# Patient Record
Sex: FEMALE | Race: WHITE | NOT HISPANIC OR LATINO | URBAN - METROPOLITAN AREA
[De-identification: names, ages, dates, MRNs, and addresses within clinical notes are randomized per-mention and may not be internally consistent; named-entity substitution may affect disease eponyms.]

---

## 2021-09-23 ENCOUNTER — NURSING HOME VISIT (OUTPATIENT)
Dept: GERIATRICS | Facility: OTHER | Age: 86
End: 2021-09-23
Payer: COMMERCIAL

## 2021-09-23 VITALS
SYSTOLIC BLOOD PRESSURE: 111 MMHG | RESPIRATION RATE: 18 BRPM | DIASTOLIC BLOOD PRESSURE: 66 MMHG | TEMPERATURE: 97.3 F | HEART RATE: 72 BPM | WEIGHT: 169.4 LBS | OXYGEN SATURATION: 93 %

## 2021-09-23 DIAGNOSIS — U07.1 PNEUMONIA DUE TO COVID-19 VIRUS: Primary | ICD-10-CM

## 2021-09-23 DIAGNOSIS — J12.82 PNEUMONIA DUE TO COVID-19 VIRUS: Primary | ICD-10-CM

## 2021-09-23 PROCEDURE — 99305 1ST NF CARE MODERATE MDM 35: CPT | Performed by: INTERNAL MEDICINE

## 2021-09-23 NOTE — PROGRESS NOTES
[facility]  [address] [place], [place] [id number]    Nursing Home Admission    NAME: Laura Lowe  AGE: 80 y o  SEX: female [id number]      Patient Location     [facility]    Patients care was coordinated with nursing facility staff  Recent vitals, labs and updated medications were reviewed on [email or web address] system of facility  Past Medical, surgical, social, medication and allergy history and patients previous records reviewed  Assessment/Plan:    Pneumonia due to COVID-19 virus  Patient was recently hospitalized with profound weakness diarrhea and a fall at home  She was found to have COVID-19 pneumonia  Patient was treated with supplemental oxygen antitussives remdesivir and dexamethasone  Currently doing well with stable SaO2  Patient remains on dexamethasone through 09/24/2021  Reduce dose to 6 mg once daily a    Acute hypoxic respiratory failure:  Improving, attributed to COVID-19 pneumonia, AFib with rapid ventricular response and diastolic CHF recently at the hospital   Patient was treated with Lasix, antitussives, remdesivir and dexamethasone  SaO2 is currently stable  Will continue to monitor    Leukocytosis:  Noted recently at the hospital attributed to steroids  Follow-up repeat CBC    Acute diarrhea:  Probably related to COVID-19 infection  Resolved    Transaminitis:  Noted recently at the hospital, attributed to COVID-19 a infection  RUQ ultrasound revealed hepatic steatosis with liver and kidney cyst    Type 2 NSTEMI:  Troponins were noted to be elevated at the hospital attributed to demand ischemia  Patient was evaluated by cardiology service  No further workup was recommended at this time    GERD:  Stable on Protonix    Hypothyroidism:  Continue levothyroxine    AFib :  Patient was noted to be in AFib with RVR upon admission to the hospital   Heart rate is currently stable on Lopressor    Continue Xarelto    Chronic diastolic CHF:  Clinically euvolemic  Continue maintenance diuretics furosemide 40 mg daily     Steroid induced hyperglycemia:  Patient was given sliding scale coverage at the hospital   Last dose of steroid to be given on 09/24  Currently not on any oral hypoglycemic agents or insulin  Follow blood sugars    Chief Complaint     Recent hospitalization for acute hypoxemic respiratory failure due to AFib with rapid ventricular response, diastolic CHF and COVID pneumonia   Condition    HPI       Patient is a 80 y o  female with past medical history significant for hypothyroidism, hypertension, atrial fibrillation on Xarelto, pacemaker, chronic diastolic failure, cardiomyopathy, tricuspid valve insufficiency  Patient was hospitalized on 09/14/2021after a fall  at home  Patient additionally reported having diarrhea since last few days along with profound weakness  Initial workup revealed patient to be in AFib with rapid ventricular response  Chest x-ray showed right opacity  Patient additionally tested positive for COVID  Patient was admitted with acute hypoxic respiratory failure due to COVID pneumonia, CHF and AFib with rapid ventricular response  Patient was also noted to have sepsis with acute organ dysfunction and transaminitis  Troponins peaked at 2 18  Troponin elevation was attributed to demand ischemia from hypoxemia and acute kidney injury  Pacemaker interrogation showed supraventricular tachycardia most likely AFib with rapid ventricular response  Patient was treated with remdesivir, diuretics and steroids  Patient was noted to have steroid induced hyperglycemia for which she received sliding scale [place] coverage  Patient was evaluated by cardiology service for AFib with RVR and positive troponins  Heart rate improved with Lopressor  No further workup was recommended at this time  Transaminitis subsequently resolved    Right upper quadrant ultrasound showed mild hepatic steatosis and simple cyst in the liver and kidney  Patient was additionally noted to have leukocytosis attributed to steroids  Procalcitonin was 23 1  Patient was initially started on vanco subsequently discontinued  Patient was later discharged to [facility] where she is being seen for post hospital admission  At the time of my evaluation patient is doing okay    Past medical history:  AFib   Diastolic CHF  Arthritis   Asthma  Cardiomyopathy  Hypothyroidism  Hypertension   Mitral valve insufficiency   Tricuspid valve insufficiency    Past surgical history:  Appendectomy   Cardiac pacemaker placement  Cover tendon release   Cholecystectomy  Hysterectomy   Right foot transmetatarsal amputation  Left 2nd and 3rd toe surgery   Bilateral total knee arthroplasty    Allergies:  Amoxicillin  Latex  With Betadine morphine  Sulfate  Adhesive tape    Family history:  History of cancer in brother, details unavailable at present    Social history:  Patient is a   No history of any tobacco or alcohol abuse      Medications:  Potassium chloride 40 mEq daily   Magnesium oxide 400 mg daily  Glucosamine daily   Fexofenadine 180 mg daily p r n  Coenzyme Q 1 tablet daily  ProSource daily   Calcium with vitamin-D twice a day   Aspirin 81 mg daily   Ascorbic acid 500 mg b i d  Pantoprazole once a day  Levothyroxine 75 mcg daily alternating with 88 mcg daily  Furosemide 40 mg daily  Flonase daily   Dexamethasone 6 mg daily last dose to be given on 09/24   Albuterol inhaler q 4 hours p r n     /66, HR 72, RR 18, Sao2 93%, Wt 169 4, Sao2 93%    Review of Systems   Constitutional: Positive for fatigue  Negative for chills and fever  HENT: Positive for hearing loss  Negative for nosebleeds and rhinorrhea  Eyes: Negative for discharge and redness  Respiratory: Negative for cough, chest tightness, shortness of breath, wheezing and stridor  Cardiovascular: Negative for chest pain and leg swelling     Gastrointestinal: Negative for abdominal distention, abdominal pain, diarrhea and vomiting  Genitourinary: Negative for dysuria, flank pain and hematuria  Musculoskeletal: Positive for gait problem  Negative for arthralgias and back pain  Skin: Negative for pallor  Neurological: Positive for weakness (Generalized)  Negative for tremors, seizures, syncope and headaches  Psychiatric/Behavioral: Negative for agitation, behavioral problems and confusion  Physical Exam  Constitutional:       General: She is not in acute distress  Appearance: She is well-developed  She is not diaphoretic  HENT:      Head: Normocephalic and atraumatic  Nose: No rhinorrhea  Eyes:      General: No scleral icterus  Right eye: No discharge  Left eye: No discharge  Pulmonary:      Effort: No respiratory distress  Breath sounds: No stridor  No wheezing  Abdominal:      General: There is no distension  Tenderness: There is no abdominal tenderness  Musculoskeletal:      Right lower leg: No edema  Left lower leg: No edema  Comments: Supraclavicular muscle wasting noted   Skin:     Coloration: Skin is not jaundiced or pale  Neurological:      General: No focal deficit present  Mental Status: She is alert  Psychiatric:         Mood and Affect: Mood normal          Behavior: Behavior normal      Patient is extremely hard of hearing  Cardiopulmonary exam was not done due to COVID positive status    Diagnostic Data       Recent labs and imaging studies were reviewed    Labs done on 09/21/2021 revealed WBC count of 10 5, hemoglobin 13 7, platelet count 207   BUN 26, creatinine 0 44, sodium 142, potassium 3 7, total protein was low at 4 9, ALT 56, AST 16  Chest x-ray revealed right bibasilar airspace pneumonia with interstitial opacity in the upper lobe   CT head revealed 1 4 x 1 6 x 1 8 cm mildly hyperdense right para felt kind extra-axial mass likely representing meningioma  Ultrasound abdomen was negative except revealed mild hepatic steatosis and 1 4 cm simple cyst 112 9 cm simple cyst in the right kidney     Code Status:      DNR DNI    Additional notes:      Reduce dexamethasone to 6 mg once a day    Continue supplemental oxygen  Continue PT OT  Follow-up repeat labs    This note was electronically signed by Dr Gabe Moore

## 2021-09-23 NOTE — ASSESSMENT & PLAN NOTE
Patient was recently hospitalized with profound weakness diarrhea and a fall at home  She was found to have COVID-19 pneumonia  Patient was treated with supplemental oxygen antitussives remdesivir and dexamethasone  Currently doing well with stable SaO2    Patient remains on dexamethasone through 09/24/2021

## 2021-09-29 ENCOUNTER — NURSING HOME VISIT (OUTPATIENT)
Dept: WOUND CARE | Facility: HOSPITAL | Age: 86
End: 2021-09-29
Payer: COMMERCIAL

## 2021-09-29 DIAGNOSIS — J12.82 PNEUMONIA DUE TO COVID-19 VIRUS: ICD-10-CM

## 2021-09-29 DIAGNOSIS — U07.1 COVID TOES: ICD-10-CM

## 2021-09-29 DIAGNOSIS — U07.1 PNEUMONIA DUE TO COVID-19 VIRUS: ICD-10-CM

## 2021-09-29 DIAGNOSIS — L89.322 PRESSURE INJURY OF LEFT BUTTOCK, STAGE 2 (HCC): ICD-10-CM

## 2021-09-29 DIAGNOSIS — R23.8 COVID TOES: ICD-10-CM

## 2021-09-29 DIAGNOSIS — L89.153 PRESSURE INJURY OF SACRAL REGION, STAGE 3 (HCC): Primary | ICD-10-CM

## 2021-09-29 PROCEDURE — 99305 1ST NF CARE MODERATE MDM 35: CPT | Performed by: NURSE PRACTITIONER

## 2021-09-30 ENCOUNTER — NURSING HOME VISIT (OUTPATIENT)
Dept: GERIATRICS | Facility: OTHER | Age: 86
End: 2021-09-30
Payer: COMMERCIAL

## 2021-09-30 DIAGNOSIS — I50.32 CHRONIC DIASTOLIC (CONGESTIVE) HEART FAILURE (HCC): ICD-10-CM

## 2021-09-30 DIAGNOSIS — J12.82 PNEUMONIA DUE TO COVID-19 VIRUS: Primary | ICD-10-CM

## 2021-09-30 DIAGNOSIS — R53.81 PHYSICAL DECONDITIONING: ICD-10-CM

## 2021-09-30 DIAGNOSIS — R23.8 COVID TOES: ICD-10-CM

## 2021-09-30 DIAGNOSIS — U07.1 PNEUMONIA DUE TO COVID-19 VIRUS: Primary | ICD-10-CM

## 2021-09-30 DIAGNOSIS — U07.1 COVID TOES: ICD-10-CM

## 2021-09-30 DIAGNOSIS — I48.91 ATRIAL FIBRILLATION, UNSPECIFIED TYPE (HCC): ICD-10-CM

## 2021-09-30 PROBLEM — E03.9 ACQUIRED HYPOTHYROIDISM: Status: ACTIVE | Noted: 2021-09-30

## 2021-09-30 PROBLEM — L89.322 PRESSURE INJURY OF LEFT BUTTOCK, STAGE 2 (HCC): Status: RESOLVED | Noted: 2021-09-29 | Resolved: 2021-09-30

## 2021-09-30 PROBLEM — I34.0 NONRHEUMATIC MITRAL VALVE REGURGITATION: Status: ACTIVE | Noted: 2021-09-30

## 2021-09-30 PROBLEM — J45.20 INTERMITTENT ASTHMA: Status: ACTIVE | Noted: 2021-09-30

## 2021-09-30 PROBLEM — I36.1 NONRHEUMATIC TRICUSPID VALVE REGURGITATION: Status: ACTIVE | Noted: 2021-09-30

## 2021-09-30 PROBLEM — I10 ESSENTIAL HYPERTENSION: Status: ACTIVE | Noted: 2021-09-30

## 2021-09-30 PROCEDURE — 99309 SBSQ NF CARE MODERATE MDM 30: CPT | Performed by: NURSE PRACTITIONER

## 2021-09-30 RX ORDER — FUROSEMIDE 40 MG/1
40 TABLET ORAL DAILY
COMMUNITY
End: 2021-10-29 | Stop reason: SDUPTHER

## 2021-09-30 RX ORDER — FEXOFENADINE HCL 180 MG/1
180 TABLET ORAL DAILY
COMMUNITY

## 2021-09-30 RX ORDER — LANOLIN ALCOHOL/MO/W.PET/CERES
1 CREAM (GRAM) TOPICAL 2 TIMES DAILY
COMMUNITY

## 2021-09-30 RX ORDER — LEVOTHYROXINE SODIUM 88 UG/1
88 TABLET ORAL EVERY OTHER DAY
Start: 2021-09-30 | End: 2021-10-29 | Stop reason: SDUPTHER

## 2021-09-30 RX ORDER — VITAMIN E 268 MG
400 CAPSULE ORAL DAILY
COMMUNITY

## 2021-09-30 RX ORDER — ASCORBIC ACID 500 MG
500 TABLET ORAL 2 TIMES DAILY
COMMUNITY

## 2021-09-30 RX ORDER — FLUTICASONE PROPIONATE 50 MCG
1 SPRAY, SUSPENSION (ML) NASAL 2 TIMES DAILY PRN
COMMUNITY

## 2021-09-30 RX ORDER — METOPROLOL TARTRATE 50 MG/1
50 TABLET, FILM COATED ORAL EVERY 12 HOURS SCHEDULED
COMMUNITY
End: 2021-10-29 | Stop reason: DRUGHIGH

## 2021-09-30 RX ORDER — MELATONIN
2000 DAILY
COMMUNITY

## 2021-09-30 RX ORDER — PANTOPRAZOLE SODIUM 40 MG/1
40 TABLET, DELAYED RELEASE ORAL DAILY
COMMUNITY
End: 2021-10-29 | Stop reason: SDUPTHER

## 2021-09-30 RX ORDER — LEVOTHYROXINE SODIUM 0.07 MG/1
75 TABLET ORAL EVERY OTHER DAY
COMMUNITY
End: 2021-10-29 | Stop reason: SDUPTHER

## 2021-09-30 RX ORDER — ASPIRIN 81 MG/1
81 TABLET, CHEWABLE ORAL DAILY
COMMUNITY

## 2021-09-30 RX ORDER — MULTIVIT-MIN/IRON FUM/FOLIC AC 7.5 MG-4
1 TABLET ORAL DAILY
COMMUNITY

## 2021-09-30 RX ORDER — POTASSIUM CHLORIDE 20 MEQ/1
20 TABLET, EXTENDED RELEASE ORAL DAILY
COMMUNITY
End: 2021-10-29 | Stop reason: SDUPTHER

## 2021-09-30 NOTE — ASSESSMENT & PLAN NOTE
- Location : Sacrum  - Wound healing status: stable  - Autolytic debridement using triad  - Pressure redistribution device - order low air loss mattress and prevalon boots  - Continue to offload  - Increase protein   - No sign localized infection during visi  - Clinical factors affecting wound healing includes age, co-morbidities, incontinence, location of the wound, mobility impairement,  and vascular condition   -Follow up : Next week

## 2021-09-30 NOTE — ASSESSMENT & PLAN NOTE
Patient with history of amputation of right 2nd toe  Patient was evaluated by Preeti Nicole yesterday due to scar tissue on all toes  Continue local wound care with Betadine as per wound CRNP  Consider arterial duplex next week if no improvement noted

## 2021-09-30 NOTE — ASSESSMENT & PLAN NOTE
- positive wound covered with eschar on all the toes  - onset  - unknown, prior to admission at [address]  - etiology - Covid related vs arterial   Have history of amputation of right second toe  - local wound care with betadine  - if no improvement, will order arterial duplex next week

## 2021-09-30 NOTE — ASSESSMENT & PLAN NOTE
Respiratory status is stable on room air    Patient denies having shortness of breath and has an infrequent nonproductive cough  Will continue to monitor closely as patient is at risk for rapid deterioration in setting of COVID-19 infection with advanced age  [de-identified] CBC on 10/04/2021

## 2021-09-30 NOTE — ASSESSMENT & PLAN NOTE
Multifactorial  Continue care and support at SNF for ADLs  Continue PT/OT  Continue fall precautions  Ensure adequate hydration and nutrition  Management of acute and chronic medical conditions as outlined

## 2021-09-30 NOTE — ASSESSMENT & PLAN NOTE
Wt Readings from Last 3 Encounters:   09/23/21 76 8 kg (169 lb 6 4 oz)   With trace right lower extremity edema, otherwise patient appears euvolemic    Weight is stable  Continue Lasix 40 mg daily  Continue CHF pathway/daily weights

## 2021-09-30 NOTE — ASSESSMENT & PLAN NOTE
Heart rate trending 70s to 80s  Continue metoprolol tartrate 50 milligrams every 12 hours  Continue rivaroxaban 20 mg daily; no signs of active bleeding noted  Follow up with Cardiology outpatient

## 2021-09-30 NOTE — PATIENT INSTRUCTIONS
Orders Placed This Encounter   Procedures    Wound cleansing and dressings     Wound:  Sacrum  Discontinue previous wound order  Cleanse the wound bed with NSS   Apply Triad paste to wound bed  Frequency : Daily and prn for soiling    Location : Left toes and right toes  Cleanse with NSS  Apply betadine to wound bed  Daily and prn for soiling    Offload all wounds  Use prevalon boots at all times when in bed  Turn and reposition frequently, maximum of every two hours  Instruct / Assist with weight shifting every 15 - 20 minutes when in chair  Increase protein intake  Monitor for any sign of infection or worsening, inform PCP or patient's primary physician in your facility       Standing Status:   Future     Standing Expiration Date:   9/29/2022

## 2021-09-30 NOTE — PROGRESS NOTES
Facility: [facility]  POS: 31 (STR)  Progress Note    Chief Complaint/Reason for visit: STR follow up  Code status:  Full code  History of Present Illness:  80-year-old female seen and examined for STR follow up  Received patient seated in wheelchair  She participated in therapy this morning and now feels extremely tired  She appears weak and tired  Respiratory status is stable  Denies shortness of breath at rest or with activity  Denies having difficulty swallowing  Denies nausea or vomiting  Patient was evaluated by Minh Wilde yesterday for stage III sacral ulcer and eschar tissue on all toes  Past Medical History:  Atrial fibrillation, acute on chronic diastolic congestive heart failure, arthritis, asthma, atrial fibrillation, cardiomyopathy, disease of thyroid gland, hypertension, mitral valve insufficiency, tricuspid valve insufficiency   History of hysterectomy 1981, transmetatarsal right amputation 10/20/2015, history of right 2nd toe amputation, history of total knee arthroplasty left 2005 and a total knee arthroplasty right 1997  Family History: unchanged from history and physical  Social History: unchanged from history and physical  Resident Since: 9/21/2021  Review of systems: Review of Systems   Constitutional: Positive for activity change and fatigue  Negative for appetite change and chills  Patient feels tired fatigues easily   HENT: Negative  Eyes: Negative  Respiratory: Positive for cough (Infrequent nonproductive cough)  Negative for shortness of breath  Cardiovascular: Positive for leg swelling  Negative for chest pain and palpitations  Gastrointestinal: Negative for abdominal pain, constipation, diarrhea, nausea and vomiting  Endocrine: Negative  Genitourinary: Negative  Musculoskeletal: Positive for gait problem  Neurological: Negative for dizziness, syncope, speech difficulty, light-headedness, numbness and headaches     Psychiatric/Behavioral: Negative for agitation, behavioral problems, dysphoric mood, hallucinations and suicidal ideas  The patient is not nervous/anxious  All other systems reviewed and are negative  Medications: All medication and routine orders were reviewed and updated  Allergies:  Amoxicillin (flushing), latex causes itching, Demerol causes over-sedation, sulfa causes rash  Adhesive tape causes rash  Consults reviewed: Other  Labs/Diagnostics (reviewed by this provider): Copy in Chart    Imaging Reviewed:  None today    Physical Exam  All vital signs were reviewed  Weight:  Temp:  97 9      BP:  110/70 Pulse:  78  Resp:  18 O2 Sat:  97% on room air  Constitutional: Normocephalic  Orientation:Person, Place, Day and Date     Physical Exam  Vitals and nursing note reviewed  Constitutional:       General: She is not in acute distress  Appearance: She is not toxic-appearing or diaphoretic  Comments: Elderly female who appears very weak and tired  HENT:      Head: Normocephalic  Nose: No congestion or rhinorrhea  Mouth/Throat:      Mouth: Mucous membranes are moist       Pharynx: No oropharyngeal exudate  Eyes:      General: No scleral icterus  Right eye: No discharge  Left eye: No discharge  Extraocular Movements: Extraocular movements intact  Conjunctiva/sclera: Conjunctivae normal       Pupils: Pupils are equal, round, and reactive to light  Cardiovascular:      Rate and Rhythm: Normal rate and regular rhythm  Pulses: Normal pulses  Pulmonary:      Effort: Pulmonary effort is normal  No respiratory distress  Breath sounds: Normal breath sounds  No wheezing, rhonchi or rales  Abdominal:      General: Bowel sounds are normal  There is no distension  Palpations: Abdomen is soft  Tenderness: There is no abdominal tenderness  There is no guarding  Musculoskeletal:      Cervical back: Neck supple  No rigidity        Right lower leg: Edema (Trace right lower extremity edema ) present  Left lower leg: No edema  Comments: Moves all 4 extremities  Lymphadenopathy:      Cervical: No cervical adenopathy  Skin:     General: Skin is warm and dry  Capillary Refill: Capillary refill takes less than 2 seconds  Comments: Vascular changes to bilateral lower extremities  Neurological:      Mental Status: She is alert  Mental status is at baseline  Motor: Weakness present  Gait: Gait abnormal    Psychiatric:         Mood and Affect: Mood normal          Behavior: Behavior normal          Thought Content: Thought content normal        Assessment/Plan:  63-year-old female with:    Pneumonia due to COVID-19 virus  Respiratory status is stable on room air  Patient denies having shortness of breath and has an infrequent nonproductive cough  Will continue to monitor closely as patient is at risk for rapid deterioration in setting of COVID-19 infection with advanced age  [de-identified] CBC on 10/04/2021    COVID toes  Patient with history of amputation of right 2nd toe  Patient was evaluated by Raul Reed yesterday due to scar tissue on all toes  Continue local wound care with Betadine as per wound CRNP  Consider arterial duplex next week if no improvement noted  Atrial fibrillation (HCC)  Heart rate trending 70s to 80s  Continue metoprolol tartrate 50 milligrams every 12 hours  Continue rivaroxaban 20 mg daily; no signs of active bleeding noted  Follow up with Cardiology outpatient    Chronic diastolic (congestive) heart failure (HCC)  Wt Readings from Last 3 Encounters:   09/23/21 76 8 kg (169 lb 6 4 oz)   With trace right lower extremity edema, otherwise patient appears euvolemic    Weight is stable  Continue Lasix 40 mg daily  Continue CHF pathway/daily weights    Physical deconditioning  Multifactorial  Continue care and support at SNF for ADLs  Continue PT/OT  Continue fall precautions  Ensure adequate hydration and nutrition  Management of acute and chronic medical conditions as outlined    This note was completed in part utilizing m-modal fluency direct voice recognition software  Grammatical errors, random word insertion, spelling mistakes, and incomplete sentences may be an occasional consequence of the system secondary to software limitations, ambient noise and hardware issues  At the time of dictation, efforts were made to edit, clarify and/or correct errors  Please read the chart carefully and recognize, using context, where substitutions have occurred  If you have any questions or concerns about the context, text or information contained within the body of this dictation, please contact myself, the provider, for further clarification      [address]  3/30/85478:44 PM

## 2021-09-30 NOTE — PROGRESS NOTES
[address] [facility]   [facility]       Patient ID: Nagi Ly is a 80 y o  female Date of Birth 6/16/1932     Location of Service: [address] [place] [address]    Performed wound round with: Wound team      Chief Complaint   Patient presents with    New Patient Visit     Sacrum, left buttock, left toes, right toes       Wound Instructions:  Orders Placed This Encounter   Procedures    Wound cleansing and dressings     Wound:  Sacrum  Discontinue previous wound order  Cleanse the wound bed with NSS   Apply Triad paste to wound bed  Frequency : Daily and prn for soiling    Location : Left toes and right toes  Cleanse with NSS  Apply betadine to wound bed  Daily and prn for soiling    Offload all wounds  Use prevalon boots at all times when in bed  Turn and reposition frequently, maximum of every two hours  Instruct / Assist with weight shifting every 15 - 20 minutes when in chair  Increase protein intake  Monitor for any sign of infection or worsening, inform PCP or patient's primary physician in your facility  Standing Status:   Future     Standing Expiration Date:   9/29/2022       Allergies  Patient has no allergy information on record  Assessment & Plan:  1  Pressure injury of sacral region, stage 3 ([facility])  Assessment & Plan:  - Location : Sacrum  - Wound healing status: stable  - Autolytic debridement using triad  - Pressure redistribution device - order low air loss mattress and prevalon boots  - Continue to offload  - Increase protein   - No sign localized infection during visi  - Clinical factors affecting wound healing includes age, co-morbidities, incontinence, location of the wound, mobility impairement,  and vascular condition   -Follow up : Next week       Orders:  -     Wound cleansing and dressings; Future    2  Pressure injury of left buttock, stage 2 ([facility] [address] )  Assessment & Plan:  - healed    Orders:  -     Wound cleansing and dressings; Future    3   COVID toes  Assessment & Plan:  - positive wound covered with eschar on all the toes  - onset  - unknown, prior to admission at [facility]  - etiology - Covid related vs arterial   Have history of amputation of right second toe  - local wound care with betadine  - if no improvement, will order arterial duplex next week  Orders:  -     Wound cleansing and dressings; Future    4  Pneumonia due to COVID-19 virus  Assessment & Plan:  - Manage by Senior care             Subjective: This is a 80year old female referred to our service for wound on the sacrum, left buttock, all the toes on the left and right treat  All the wound were present on admission at [facility]  There is no information regarding the wouns in the past medical record  Left buttock and sacrum - Etiology - pressure ulcer, Severity : no sign of infection  Right and left toes - Etiology : possible COVID related  Severity: no sign of infection  Review of Systems   Constitutional: Negative  HENT: Negative  Eyes: Negative  Respiratory: Negative  Gastrointestinal: Negative  Endocrine: Negative  Genitourinary: Negative  Musculoskeletal: Positive for gait problem  Skin: Positive for wound  See HPI   Neurological: Negative for dizziness and headaches  Psychiatric/Behavioral: Negative for behavioral problems  Objective: There were no vitals taken for this visit  Physical Exam  Constitutional:       Appearance: Normal appearance  HENT:      Head: Normocephalic  Nose: Nose normal       Mouth/Throat:      Mouth: Mucous membranes are moist    Cardiovascular:      Rate and Rhythm: Normal rate  Pulmonary:      Effort: Pulmonary effort is normal    Abdominal:      Palpations: Abdomen is soft  Tenderness: There is no abdominal tenderness  There is no guarding  Musculoskeletal:         General: No tenderness  Cervical back: Normal range of motion  Right lower leg: No edema  Left lower leg: No edema  Comments: LROM   Skin:     General: Skin is warm  Findings: Lesion present  Comments: Location Sacrum wound bed - 5 0 x 2 5 x 0 1 cm , no undermining, no tunneling, 0 % epithelial, 100%granulation, 0%slough, exudate - scant amount of serosanguinous drainage, no malodor ( assess after dressing removal and cleansing), wound edge - attached to base, periwound - intact  No localized sign of infection, Denies pain    Left buttock - healed    Left toes ( 5 toes) - 5 0 x 10 0 x 0 1, 100% eschar, no obvious sign of infection, no drainage    Right toes ( 2nd toes amputated) - 1 5 x 7 0 x 0 1 cm, 100% eschar, no obvious sing of infection, no drainage     Neurological:      Mental Status: She is alert  Gait: Gait abnormal    Psychiatric:         Mood and Affect: Mood normal          Behavior: Behavior normal               Procedures           Patient's care was coordinated with nursing facility staff  Recent vitals, labs and updated medications were reviewed on EMR or chart system of facility   Past Medical, surgical, social, medication and allergy history and patient's previous records were reviewed and updated as appropriate:    Surgical History    Surgery Date Site/Laterality Comments   CARDIAC PACEMAKER PLACEMENT 1/1/2011 - 12/31/2011        CARPAL TUNNEL RELEASE 1/1/2007 - 12/31/2007 Bilateral      TOTAL KNEE ARTHROPLASTY 1/1/2005 - 12/31/2005 Left      TOTAL KNEE ARTHROPLASTY 1/1/1997 - 12/31/1997 Right      HYSTERECTOMY 1/1/1981 - 12/31/1981        CHOLECYSTECTOMY 1/1/1964 - 12/31/1964        APPENDECTOMY          TOE SURGERY     SECOND AND THIRD TOE     ME AMPUTATION FOOT,TRANSMETATARSAL 10/20/2015 Toe/Right Procedure: 37276 RIGHT 2ND TOE AMPUTATION; Surgeon: Nelia Guardado DPM; Location:  OR; Service: Orthopedics     EMG/NCS/US 11/8/2016        Medical History    Medical History Date Comments   Hypertension       Asthma       Atrial fibrillation ([address] )       Disease of thyroid gland       Arthritis       Mitral valve insufficiency, unspecified etiology       Tricuspid valve insufficiency, unspecified etiology       Cardiomyopathy ([facility] [address] )       Acute on chronic diastolic congestive heart failure (HCC)       A-fib ([facility] )       Family History    Medical History Relation Name Comments   Cancer Brother         Relation Name Status Comments   Brother        Father        Mother        Social History    Tobacco Use Types Packs/Day Years Used Date   Never Smoker           Smokeless Tobacco: Never Used           Alcohol Use Standard Drinks/Week Comments   No 0 (1 standard drink = 0 6 oz pure alcohol)       Sex Assigned at Birth Date Recorded   Not on file             Coordination of Care: Wound team aware of the treatment plan    Patient / Staff education : Patient / Staff was given education on sign of infection and pressure ulcer prevention  Patient/ Staff verbalized understanding     Follow up :  Return in about 1 week (around 10/6/2021)  Voice-recognition software may have been used in the preparation of this document  Occasional wrong word or "sound-alike" substitutions may have occurred due to the inherent limitations of voice recognition software  Interpretation should be guided by context        PAMELA Rojas

## 2021-10-04 ENCOUNTER — NURSING HOME VISIT (OUTPATIENT)
Dept: GERIATRICS | Facility: OTHER | Age: 86
End: 2021-10-04
Payer: COMMERCIAL

## 2021-10-04 VITALS
TEMPERATURE: 97.6 F | DIASTOLIC BLOOD PRESSURE: 74 MMHG | RESPIRATION RATE: 18 BRPM | OXYGEN SATURATION: 96 % | HEART RATE: 83 BPM | SYSTOLIC BLOOD PRESSURE: 112 MMHG

## 2021-10-04 DIAGNOSIS — J12.82 PNEUMONIA DUE TO COVID-19 VIRUS: Primary | ICD-10-CM

## 2021-10-04 DIAGNOSIS — I48.91 ATRIAL FIBRILLATION, UNSPECIFIED TYPE (HCC): ICD-10-CM

## 2021-10-04 DIAGNOSIS — I50.32 CHRONIC DIASTOLIC (CONGESTIVE) HEART FAILURE (HCC): ICD-10-CM

## 2021-10-04 DIAGNOSIS — I10 ESSENTIAL HYPERTENSION: ICD-10-CM

## 2021-10-04 DIAGNOSIS — U07.1 PNEUMONIA DUE TO COVID-19 VIRUS: Primary | ICD-10-CM

## 2021-10-04 DIAGNOSIS — R53.81 PHYSICAL DECONDITIONING: ICD-10-CM

## 2021-10-04 PROCEDURE — 99309 SBSQ NF CARE MODERATE MDM 30: CPT | Performed by: NURSE PRACTITIONER

## 2021-10-06 ENCOUNTER — NURSING HOME VISIT (OUTPATIENT)
Dept: GERIATRICS | Facility: OTHER | Age: 86
End: 2021-10-06
Payer: COMMERCIAL

## 2021-10-06 ENCOUNTER — NURSING HOME VISIT (OUTPATIENT)
Dept: WOUND CARE | Facility: HOSPITAL | Age: 86
End: 2021-10-06
Payer: COMMERCIAL

## 2021-10-06 VITALS
HEART RATE: 76 BPM | DIASTOLIC BLOOD PRESSURE: 72 MMHG | OXYGEN SATURATION: 98 % | TEMPERATURE: 97.8 F | RESPIRATION RATE: 16 BRPM | SYSTOLIC BLOOD PRESSURE: 117 MMHG

## 2021-10-06 DIAGNOSIS — R23.8 COVID TOES: ICD-10-CM

## 2021-10-06 DIAGNOSIS — J12.82 PNEUMONIA DUE TO COVID-19 VIRUS: ICD-10-CM

## 2021-10-06 DIAGNOSIS — L89.150 PRESSURE INJURY OF SACRAL REGION, UNSTAGEABLE (HCC): Primary | ICD-10-CM

## 2021-10-06 DIAGNOSIS — I48.91 ATRIAL FIBRILLATION, UNSPECIFIED TYPE (HCC): ICD-10-CM

## 2021-10-06 DIAGNOSIS — I50.32 CHRONIC DIASTOLIC (CONGESTIVE) HEART FAILURE (HCC): Primary | ICD-10-CM

## 2021-10-06 DIAGNOSIS — U07.1 PNEUMONIA DUE TO COVID-19 VIRUS: ICD-10-CM

## 2021-10-06 DIAGNOSIS — I10 ESSENTIAL HYPERTENSION: ICD-10-CM

## 2021-10-06 DIAGNOSIS — U07.1 COVID TOES: ICD-10-CM

## 2021-10-06 DIAGNOSIS — L89.150 PRESSURE INJURY OF SACRAL REGION, UNSTAGEABLE (HCC): ICD-10-CM

## 2021-10-06 DIAGNOSIS — R53.81 PHYSICAL DECONDITIONING: ICD-10-CM

## 2021-10-06 PROCEDURE — 99309 SBSQ NF CARE MODERATE MDM 30: CPT | Performed by: NURSE PRACTITIONER

## 2021-10-06 PROCEDURE — 99308 SBSQ NF CARE LOW MDM 20: CPT | Performed by: NURSE PRACTITIONER

## 2021-10-11 ENCOUNTER — NURSING HOME VISIT (OUTPATIENT)
Dept: GERIATRICS | Facility: OTHER | Age: 86
End: 2021-10-11
Payer: COMMERCIAL

## 2021-10-11 DIAGNOSIS — J12.82 PNEUMONIA DUE TO COVID-19 VIRUS: Primary | ICD-10-CM

## 2021-10-11 DIAGNOSIS — L89.150 PRESSURE INJURY OF SACRAL REGION, UNSTAGEABLE (HCC): ICD-10-CM

## 2021-10-11 DIAGNOSIS — M25.572 LEFT ANKLE PAIN, UNSPECIFIED CHRONICITY: ICD-10-CM

## 2021-10-11 DIAGNOSIS — R23.8 COVID TOES: ICD-10-CM

## 2021-10-11 DIAGNOSIS — R53.81 PHYSICAL DECONDITIONING: ICD-10-CM

## 2021-10-11 DIAGNOSIS — U07.1 COVID TOES: ICD-10-CM

## 2021-10-11 DIAGNOSIS — E03.9 ACQUIRED HYPOTHYROIDISM: ICD-10-CM

## 2021-10-11 DIAGNOSIS — U07.1 PNEUMONIA DUE TO COVID-19 VIRUS: Primary | ICD-10-CM

## 2021-10-11 DIAGNOSIS — I50.32 CHRONIC DIASTOLIC (CONGESTIVE) HEART FAILURE (HCC): ICD-10-CM

## 2021-10-11 DIAGNOSIS — I48.0 PAROXYSMAL ATRIAL FIBRILLATION (HCC): ICD-10-CM

## 2021-10-11 PROCEDURE — 99309 SBSQ NF CARE MODERATE MDM 30: CPT | Performed by: NURSE PRACTITIONER

## 2021-10-12 PROBLEM — M25.572 LEFT ANKLE PAIN: Status: ACTIVE | Noted: 2021-10-12

## 2021-10-13 ENCOUNTER — NURSING HOME VISIT (OUTPATIENT)
Dept: WOUND CARE | Facility: HOSPITAL | Age: 86
End: 2021-10-13
Payer: COMMERCIAL

## 2021-10-13 DIAGNOSIS — R23.8 COVID TOES: ICD-10-CM

## 2021-10-13 DIAGNOSIS — L25.8 DERMATITIS ASSOCIATED WITH INCONTINENCE: ICD-10-CM

## 2021-10-13 DIAGNOSIS — R32 DERMATITIS ASSOCIATED WITH INCONTINENCE: ICD-10-CM

## 2021-10-13 DIAGNOSIS — U07.1 COVID TOES: ICD-10-CM

## 2021-10-13 DIAGNOSIS — L89.150 PRESSURE INJURY OF SACRAL REGION, UNSTAGEABLE (HCC): Primary | ICD-10-CM

## 2021-10-13 PROCEDURE — 99309 SBSQ NF CARE MODERATE MDM 30: CPT | Performed by: NURSE PRACTITIONER

## 2021-10-14 ENCOUNTER — NURSING HOME VISIT (OUTPATIENT)
Dept: GERIATRICS | Facility: OTHER | Age: 86
End: 2021-10-14
Payer: COMMERCIAL

## 2021-10-14 VITALS
HEART RATE: 74 BPM | OXYGEN SATURATION: 98 % | DIASTOLIC BLOOD PRESSURE: 82 MMHG | SYSTOLIC BLOOD PRESSURE: 150 MMHG | TEMPERATURE: 97.9 F | RESPIRATION RATE: 18 BRPM

## 2021-10-14 DIAGNOSIS — I50.32 CHRONIC DIASTOLIC (CONGESTIVE) HEART FAILURE (HCC): ICD-10-CM

## 2021-10-14 DIAGNOSIS — U07.1 PNEUMONIA DUE TO COVID-19 VIRUS: Primary | ICD-10-CM

## 2021-10-14 DIAGNOSIS — I10 ESSENTIAL HYPERTENSION: ICD-10-CM

## 2021-10-14 DIAGNOSIS — U07.1 COVID TOES: ICD-10-CM

## 2021-10-14 DIAGNOSIS — E03.9 ACQUIRED HYPOTHYROIDISM: ICD-10-CM

## 2021-10-14 DIAGNOSIS — R53.81 PHYSICAL DECONDITIONING: ICD-10-CM

## 2021-10-14 DIAGNOSIS — R23.8 COVID TOES: ICD-10-CM

## 2021-10-14 DIAGNOSIS — I48.0 PAROXYSMAL ATRIAL FIBRILLATION (HCC): ICD-10-CM

## 2021-10-14 DIAGNOSIS — J12.82 PNEUMONIA DUE TO COVID-19 VIRUS: Primary | ICD-10-CM

## 2021-10-14 PROCEDURE — 99309 SBSQ NF CARE MODERATE MDM 30: CPT | Performed by: NURSE PRACTITIONER

## 2021-10-18 ENCOUNTER — NURSING HOME VISIT (OUTPATIENT)
Dept: GERIATRICS | Facility: OTHER | Age: 86
End: 2021-10-18
Payer: COMMERCIAL

## 2021-10-18 VITALS
SYSTOLIC BLOOD PRESSURE: 100 MMHG | OXYGEN SATURATION: 96 % | DIASTOLIC BLOOD PRESSURE: 66 MMHG | TEMPERATURE: 97.5 F | HEART RATE: 72 BPM | RESPIRATION RATE: 16 BRPM

## 2021-10-18 DIAGNOSIS — I48.0 PAROXYSMAL ATRIAL FIBRILLATION (HCC): ICD-10-CM

## 2021-10-18 DIAGNOSIS — R82.90 FOUL SMELLING URINE: ICD-10-CM

## 2021-10-18 DIAGNOSIS — L89.150 PRESSURE INJURY OF SACRAL REGION, UNSTAGEABLE (HCC): ICD-10-CM

## 2021-10-18 DIAGNOSIS — R53.81 PHYSICAL DECONDITIONING: ICD-10-CM

## 2021-10-18 DIAGNOSIS — E03.9 ACQUIRED HYPOTHYROIDISM: ICD-10-CM

## 2021-10-18 DIAGNOSIS — I10 ESSENTIAL HYPERTENSION: ICD-10-CM

## 2021-10-18 DIAGNOSIS — U07.1 PNEUMONIA DUE TO COVID-19 VIRUS: Primary | ICD-10-CM

## 2021-10-18 DIAGNOSIS — J12.82 PNEUMONIA DUE TO COVID-19 VIRUS: Primary | ICD-10-CM

## 2021-10-18 DIAGNOSIS — I50.32 CHRONIC DIASTOLIC (CONGESTIVE) HEART FAILURE (HCC): ICD-10-CM

## 2021-10-18 PROCEDURE — 99309 SBSQ NF CARE MODERATE MDM 30: CPT | Performed by: NURSE PRACTITIONER

## 2021-10-20 ENCOUNTER — NURSING HOME VISIT (OUTPATIENT)
Dept: WOUND CARE | Facility: HOSPITAL | Age: 86
End: 2021-10-20
Payer: COMMERCIAL

## 2021-10-20 DIAGNOSIS — R32 DERMATITIS ASSOCIATED WITH INCONTINENCE: ICD-10-CM

## 2021-10-20 DIAGNOSIS — L89.153 PRESSURE INJURY OF SACRAL REGION, STAGE 3 (HCC): ICD-10-CM

## 2021-10-20 DIAGNOSIS — U07.1 COVID TOES: Primary | ICD-10-CM

## 2021-10-20 DIAGNOSIS — L25.8 DERMATITIS ASSOCIATED WITH INCONTINENCE: ICD-10-CM

## 2021-10-20 DIAGNOSIS — R23.8 COVID TOES: Primary | ICD-10-CM

## 2021-10-20 PROBLEM — L89.322 PRESSURE INJURY OF LEFT BUTTOCK, STAGE 2 (HCC): Status: RESOLVED | Noted: 2021-09-29 | Resolved: 2021-10-20

## 2021-10-20 PROCEDURE — 99308 SBSQ NF CARE LOW MDM 20: CPT | Performed by: NURSE PRACTITIONER

## 2021-10-21 ENCOUNTER — NURSING HOME VISIT (OUTPATIENT)
Dept: GERIATRICS | Facility: OTHER | Age: 86
End: 2021-10-21
Payer: COMMERCIAL

## 2021-10-21 VITALS
DIASTOLIC BLOOD PRESSURE: 64 MMHG | OXYGEN SATURATION: 97 % | TEMPERATURE: 97.8 F | HEART RATE: 74 BPM | RESPIRATION RATE: 18 BRPM | SYSTOLIC BLOOD PRESSURE: 110 MMHG

## 2021-10-21 DIAGNOSIS — E03.9 ACQUIRED HYPOTHYROIDISM: Primary | ICD-10-CM

## 2021-10-21 DIAGNOSIS — R82.90 FOUL SMELLING URINE: ICD-10-CM

## 2021-10-21 DIAGNOSIS — I48.0 PAROXYSMAL ATRIAL FIBRILLATION (HCC): ICD-10-CM

## 2021-10-21 DIAGNOSIS — I50.32 CHRONIC DIASTOLIC (CONGESTIVE) HEART FAILURE (HCC): ICD-10-CM

## 2021-10-21 DIAGNOSIS — R53.81 PHYSICAL DECONDITIONING: ICD-10-CM

## 2021-10-21 DIAGNOSIS — L89.153 PRESSURE INJURY OF SACRAL REGION, STAGE 3 (HCC): ICD-10-CM

## 2021-10-21 DIAGNOSIS — U07.1 COVID TOES: ICD-10-CM

## 2021-10-21 DIAGNOSIS — I10 ESSENTIAL HYPERTENSION: ICD-10-CM

## 2021-10-21 DIAGNOSIS — R23.8 COVID TOES: ICD-10-CM

## 2021-10-21 PROCEDURE — 99309 SBSQ NF CARE MODERATE MDM 30: CPT | Performed by: NURSE PRACTITIONER

## 2021-10-27 ENCOUNTER — NURSING HOME VISIT (OUTPATIENT)
Dept: WOUND CARE | Facility: HOSPITAL | Age: 86
End: 2021-10-27
Payer: COMMERCIAL

## 2021-10-27 DIAGNOSIS — L25.8 DERMATITIS ASSOCIATED WITH INCONTINENCE: ICD-10-CM

## 2021-10-27 DIAGNOSIS — U07.1 COVID TOES: Primary | ICD-10-CM

## 2021-10-27 DIAGNOSIS — L89.322 PRESSURE INJURY OF LEFT BUTTOCK, STAGE 2 (HCC): ICD-10-CM

## 2021-10-27 DIAGNOSIS — R32 DERMATITIS ASSOCIATED WITH INCONTINENCE: ICD-10-CM

## 2021-10-27 DIAGNOSIS — L89.150 PRESSURE INJURY OF SACRAL REGION, UNSTAGEABLE (HCC): ICD-10-CM

## 2021-10-27 DIAGNOSIS — R23.8 COVID TOES: Primary | ICD-10-CM

## 2021-10-27 PROCEDURE — 99308 SBSQ NF CARE LOW MDM 20: CPT | Performed by: NURSE PRACTITIONER

## 2021-10-29 ENCOUNTER — NURSING HOME VISIT (OUTPATIENT)
Dept: GERIATRICS | Facility: OTHER | Age: 86
End: 2021-10-29
Payer: COMMERCIAL

## 2021-10-29 DIAGNOSIS — I48.91 ATRIAL FIBRILLATION, UNSPECIFIED TYPE (HCC): ICD-10-CM

## 2021-10-29 DIAGNOSIS — I10 ESSENTIAL HYPERTENSION: ICD-10-CM

## 2021-10-29 DIAGNOSIS — J45.20 MILD INTERMITTENT ASTHMA WITHOUT COMPLICATION: ICD-10-CM

## 2021-10-29 DIAGNOSIS — I50.32 CHRONIC DIASTOLIC (CONGESTIVE) HEART FAILURE (HCC): Primary | ICD-10-CM

## 2021-10-29 DIAGNOSIS — E03.9 ACQUIRED HYPOTHYROIDISM: ICD-10-CM

## 2021-10-29 DIAGNOSIS — F32.A DEPRESSION, UNSPECIFIED DEPRESSION TYPE: ICD-10-CM

## 2021-10-29 DIAGNOSIS — I36.1 NONRHEUMATIC TRICUSPID VALVE REGURGITATION: ICD-10-CM

## 2021-10-29 PROBLEM — K21.9 GASTROESOPHAGEAL REFLUX DISEASE WITHOUT ESOPHAGITIS: Status: ACTIVE | Noted: 2021-10-29

## 2021-10-29 PROBLEM — U07.1 COVID TOES: Status: RESOLVED | Noted: 2021-09-29 | Resolved: 2021-10-29

## 2021-10-29 PROBLEM — U07.1 COVID-19: Status: ACTIVE | Noted: 2021-10-29

## 2021-10-29 PROBLEM — R23.8 COVID TOES: Status: RESOLVED | Noted: 2021-09-29 | Resolved: 2021-10-29

## 2021-10-29 PROBLEM — R32 DERMATITIS ASSOCIATED WITH INCONTINENCE: Status: RESOLVED | Noted: 2021-10-13 | Resolved: 2021-10-29

## 2021-10-29 PROBLEM — L25.8 DERMATITIS ASSOCIATED WITH INCONTINENCE: Status: RESOLVED | Noted: 2021-10-13 | Resolved: 2021-10-29

## 2021-10-29 PROBLEM — R82.90 FOUL SMELLING URINE: Status: RESOLVED | Noted: 2021-10-18 | Resolved: 2021-10-29

## 2021-10-29 PROCEDURE — 99316 NF DSCHRG MGMT 30 MIN+: CPT | Performed by: NURSE PRACTITIONER

## 2021-10-29 RX ORDER — FUROSEMIDE 40 MG/1
40 TABLET ORAL DAILY
Qty: 30 TABLET | Refills: 0 | Status: SHIPPED | OUTPATIENT
Start: 2021-10-29

## 2021-10-29 RX ORDER — ALBUTEROL SULFATE 90 UG/1
2 AEROSOL, METERED RESPIRATORY (INHALATION) EVERY 4 HOURS PRN
Qty: 30 G | Refills: 1 | Status: SHIPPED | OUTPATIENT
Start: 2021-10-29

## 2021-10-29 RX ORDER — PANTOPRAZOLE SODIUM 40 MG/1
40 TABLET, DELAYED RELEASE ORAL DAILY
Qty: 30 TABLET | Refills: 0 | Status: SHIPPED | OUTPATIENT
Start: 2021-10-29

## 2021-10-29 RX ORDER — POTASSIUM CHLORIDE 20 MEQ/1
20 TABLET, EXTENDED RELEASE ORAL DAILY
Qty: 30 TABLET | Refills: 0 | Status: SHIPPED | OUTPATIENT
Start: 2021-10-29

## 2021-10-29 RX ORDER — METOPROLOL SUCCINATE 25 MG/1
25 TABLET, EXTENDED RELEASE ORAL DAILY
Qty: 60 TABLET | Refills: 0 | Status: SHIPPED | OUTPATIENT
Start: 2021-10-29 | End: 2021-11-01

## 2021-10-29 RX ORDER — LEVOTHYROXINE SODIUM 88 UG/1
88 TABLET ORAL EVERY OTHER DAY
Qty: 30 TABLET | Refills: 0 | Status: SHIPPED | OUTPATIENT
Start: 2021-10-29

## 2021-10-29 RX ORDER — LEVOTHYROXINE SODIUM 0.07 MG/1
75 TABLET ORAL EVERY OTHER DAY
Qty: 30 TABLET | Refills: 0 | Status: SHIPPED | OUTPATIENT
Start: 2021-10-29

## 2021-11-01 RX ORDER — METOPROLOL SUCCINATE 25 MG/1
TABLET, EXTENDED RELEASE ORAL
Qty: 60 TABLET | Refills: 0 | Status: SHIPPED | OUTPATIENT
Start: 2021-11-01